# Patient Record
Sex: FEMALE | Race: WHITE | URBAN - METROPOLITAN AREA
[De-identification: names, ages, dates, MRNs, and addresses within clinical notes are randomized per-mention and may not be internally consistent; named-entity substitution may affect disease eponyms.]

---

## 2019-03-05 NOTE — TELEPHONE ENCOUNTER
Patient is on Dr. Judd Martinez list of patients that need follow up visit for either Well visit, HTN, Diabetes, etc,. .... Patient last seen 02/2016.  I need a confirmation from patient that he is seeing another PCP in order for me to fill out a  PCP CHANGE or R

## 2022-12-15 NOTE — TELEPHONE ENCOUNTER
Scheduled for:  Colonoscopy 73716  Provider Name:  Dr Ananya Da Silva  Date:  1/26/2023  Location:  [facility]  Sedation:  MAC  Time:  0537 (pt is aware to arrive at 1130)    Prep:  Colyte  Meds/Allergies Reconciled?:  Physician reviewed  Diagnosis with codes:  HSV B00.9; Constipation K59.00  Was patient informed to call insurance with codes (Y/N):  Y     Referral sent?:  NA  [facility] or [address] notified?:  I sent an electronic request to Endo Scheduling and received a confirmation today. Medication Orders:  Pt is aware to NOT take iron pills, herbal meds and diet supplements for 7 days before exam. Also to NOT take any form of alcohol, recreational drugs and any forms of ED meds 24 hours before exam.     Misc Orders:       Further instructions given by staff:  I discussed the prep intructions with the patient in office which SHE verbally understood. Copy of instructions was handed to patient as well. Patient was also advised he will receive a call from PAT nurse 72-24 hours prior procedure to schedule Covid test done.

## 2022-12-15 NOTE — PATIENT INSTRUCTIONS
-miralax in am  -fibercon or cirucel in evening  -gyne exam    1. Schedule colonoscopy with MAC w/ any female md [Diagnosis: brbpr, hsv1]    2.  bowel prep from pharmacy (split trilyte)    3. Continue all medications for procedure    4. Read all bowel prep instructions carefully    5. AVOID seeds, nuts, popcorn, raw fruits and vegetables (cooked is okay) for 2-3 days before procedure    6. You will need to be tested for COVID within 72 hours of your procedure. You will be contacted with instructions on how to do this.       >>>Please note: if you were prescribed Suprep for the bowel prep and it is too expensive or not covered by insurance, it is okay to substitute Trilyte (or any similar generic prep). This can be done by notifying the pharmacy or calling our office.

## 2023-01-20 PROBLEM — M35.9 CONNECTIVE TISSUE DISORDER (HCC): Status: ACTIVE | Noted: 2023-01-20

## 2023-01-23 NOTE — TELEPHONE ENCOUNTER
Patient wanted to know if pedialyte is a clear liquid. Wanted to know if she could have jello and if she could add spices to her broth. She also wanted to know if she could use a liquid sweetner in her coffee when she is on a clear liquid diet. She wanted to know if she could have canned peaches-I let her know when she is on a clear liquid diet she cannot have these as they are not a clear liquid. I also gave her our prep web address because it has a list of examples of what she can have on a clear liquid diet. All questions answered.

## 2023-01-26 ENCOUNTER — HOSPITAL ENCOUNTER (OUTPATIENT)
Facility: HOSPITAL | Age: 58
Setting detail: HOSPITAL OUTPATIENT SURGERY
Discharge: HOME | End: 2023-01-26
Attending: INTERNAL MEDICINE | Admitting: INTERNAL MEDICINE
Payer: COMMERCIAL

## 2023-01-26 ENCOUNTER — ANESTHESIA (OUTPATIENT)
Dept: ENDOSCOPY | Facility: HOSPITAL | Age: 58
End: 2023-01-26
Payer: COMMERCIAL

## 2023-01-26 ENCOUNTER — ANESTHESIA EVENT (OUTPATIENT)
Dept: ENDOSCOPY | Facility: HOSPITAL | Age: 58
End: 2023-01-26
Payer: COMMERCIAL

## 2023-01-26 VITALS
BODY MASS INDEX: 22.15 KG/M2 | HEART RATE: 60 BPM | HEIGHT: 63 IN | SYSTOLIC BLOOD PRESSURE: 102 MMHG | DIASTOLIC BLOOD PRESSURE: 81 MMHG | OXYGEN SATURATION: 95 % | TEMPERATURE: 98 F | RESPIRATION RATE: 17 BRPM | WEIGHT: 125 LBS

## 2023-01-26 DIAGNOSIS — K59.00 CONSTIPATION, UNSPECIFIED CONSTIPATION TYPE: ICD-10-CM

## 2023-01-26 DIAGNOSIS — B00.9 HERPES SIMPLEX VIRUS INFECTION: ICD-10-CM

## 2023-01-26 PROCEDURE — 0DBL8ZX EXCISION OF TRANSVERSE COLON, VIA NATURAL OR ARTIFICIAL OPENING ENDOSCOPIC, DIAGNOSTIC: ICD-10-PCS | Performed by: INTERNAL MEDICINE

## 2023-01-26 PROCEDURE — 45385 COLONOSCOPY W/LESION REMOVAL: CPT | Performed by: INTERNAL MEDICINE

## 2023-01-26 RX ORDER — MORPHINE SULFATE 4 MG/ML
2 INJECTION, SOLUTION INTRAMUSCULAR; INTRAVENOUS EVERY 10 MIN PRN
OUTPATIENT
Start: 2023-01-26

## 2023-01-26 RX ORDER — MORPHINE SULFATE 4 MG/ML
4 INJECTION, SOLUTION INTRAMUSCULAR; INTRAVENOUS EVERY 10 MIN PRN
OUTPATIENT
Start: 2023-01-26

## 2023-01-26 RX ORDER — NALOXONE HYDROCHLORIDE 0.4 MG/ML
80 INJECTION, SOLUTION INTRAMUSCULAR; INTRAVENOUS; SUBCUTANEOUS AS NEEDED
OUTPATIENT
Start: 2023-01-26 | End: 2023-01-26

## 2023-01-26 RX ORDER — HYDROMORPHONE HYDROCHLORIDE 1 MG/ML
0.6 INJECTION, SOLUTION INTRAMUSCULAR; INTRAVENOUS; SUBCUTANEOUS EVERY 5 MIN PRN
OUTPATIENT
Start: 2023-01-26

## 2023-01-26 RX ORDER — MIDAZOLAM HYDROCHLORIDE 1 MG/ML
INJECTION INTRAMUSCULAR; INTRAVENOUS AS NEEDED
Status: DISCONTINUED | OUTPATIENT
Start: 2023-01-26 | End: 2023-01-26 | Stop reason: SURG

## 2023-01-26 RX ORDER — HYDROMORPHONE HYDROCHLORIDE 1 MG/ML
0.2 INJECTION, SOLUTION INTRAMUSCULAR; INTRAVENOUS; SUBCUTANEOUS EVERY 5 MIN PRN
OUTPATIENT
Start: 2023-01-26

## 2023-01-26 RX ORDER — SODIUM CHLORIDE, SODIUM LACTATE, POTASSIUM CHLORIDE, CALCIUM CHLORIDE 600; 310; 30; 20 MG/100ML; MG/100ML; MG/100ML; MG/100ML
INJECTION, SOLUTION INTRAVENOUS CONTINUOUS
Status: DISCONTINUED | OUTPATIENT
Start: 2023-01-26 | End: 2023-01-26

## 2023-01-26 RX ORDER — SODIUM CHLORIDE, SODIUM LACTATE, POTASSIUM CHLORIDE, CALCIUM CHLORIDE 600; 310; 30; 20 MG/100ML; MG/100ML; MG/100ML; MG/100ML
INJECTION, SOLUTION INTRAVENOUS CONTINUOUS
OUTPATIENT
Start: 2023-01-26

## 2023-01-26 RX ORDER — HYDROMORPHONE HYDROCHLORIDE 1 MG/ML
0.4 INJECTION, SOLUTION INTRAMUSCULAR; INTRAVENOUS; SUBCUTANEOUS EVERY 5 MIN PRN
OUTPATIENT
Start: 2023-01-26

## 2023-01-26 RX ORDER — MORPHINE SULFATE 10 MG/ML
6 INJECTION, SOLUTION INTRAMUSCULAR; INTRAVENOUS EVERY 10 MIN PRN
OUTPATIENT
Start: 2023-01-26

## 2023-01-26 RX ORDER — ACYCLOVIR 800 MG/1
800 TABLET ORAL 2 TIMES DAILY
COMMUNITY

## 2023-01-26 RX ADMIN — SODIUM CHLORIDE, SODIUM LACTATE, POTASSIUM CHLORIDE, CALCIUM CHLORIDE: 600; 310; 30; 20 INJECTION, SOLUTION INTRAVENOUS at 12:34:00

## 2023-01-26 RX ADMIN — MIDAZOLAM HYDROCHLORIDE 2 MG: 1 INJECTION INTRAMUSCULAR; INTRAVENOUS at 12:49:00

## 2023-01-26 NOTE — DISCHARGE INSTRUCTIONS

## 2023-01-27 ENCOUNTER — TELEPHONE (OUTPATIENT)
Facility: CLINIC | Age: 58
End: 2023-01-27

## 2023-01-27 NOTE — TELEPHONE ENCOUNTER
Patient is calling to request a script for miralax since over the counter is to expensive and should like insurance to cover part of the cost. Does the patient have to schedule a follow up for this

## 2023-01-30 RX ORDER — POLYETHYLENE GLYCOL 3350 17 G/17G
17 POWDER, FOR SOLUTION ORAL DAILY
Qty: 1 EACH | Refills: 3 | Status: SHIPPED | OUTPATIENT
Start: 2023-01-30

## 2023-01-30 NOTE — TELEPHONE ENCOUNTER
Aki Weaver,    Please advise message below. Patient is asking script for Miralax d/t cost with otc. Thank you.

## 2023-01-30 NOTE — TELEPHONE ENCOUNTER
Marge Nguyen,    Patient is currently taking 2 scoops (total of 34 grams) daily in the morning and  fiber at hs and is working for her. Do you want to change the dose as above on the rx sent? Called patient, ALFREDO verified. Informed patient that miralax was sent to pharmacy.  She was asking if it is the same dose as she is currently taking 2 scoops Q am.

## 2023-01-31 ENCOUNTER — TELEPHONE (OUTPATIENT)
Facility: CLINIC | Age: 58
End: 2023-01-31

## 2023-01-31 RX ORDER — POLYETHYLENE GLYCOL 3350 17 G/17G
34 POWDER, FOR SOLUTION ORAL DAILY
Qty: 1020 G | Refills: 0 | Status: SHIPPED | OUTPATIENT
Start: 2023-01-31 | End: 2023-03-02

## 2023-01-31 NOTE — TELEPHONE ENCOUNTER
I sent rx for the bottle vs packets, so she should be okay. She can titrate the dose as needed (1-2 capfuls daily).     Thanks,  [place]

## 2023-01-31 NOTE — TELEPHONE ENCOUNTER
Called pt to discuss path  Diminutive polyp normal mucosa. Repeat colonoscopy advised in 10 years  She is also requesting RX for miralax two capful daily which is working currently for constipation, will send.

## 2023-04-10 RX ORDER — POLYETHYLENE GLYCOL 3350 17 G/17G
17 POWDER, FOR SOLUTION ORAL 2 TIMES DAILY
Qty: 1020 G | Refills: 0 | Status: SHIPPED | OUTPATIENT
Start: 2023-04-10 | End: 2023-05-10

## 2023-05-16 ENCOUNTER — TELEPHONE (OUTPATIENT)
Facility: CLINIC | Age: 58
End: 2023-05-16

## 2023-05-16 NOTE — TELEPHONE ENCOUNTER
Adam Smoker,    Please advise below. Thank you. Called patient to assess symptoms, name/ verified. Reported complaints:\"acidy feeling middle of back and around the front above sternum\", took zantac with relief initially    Onset: since last  East, had bad episode yesterday    Any difficulty swallowing:No    Burning or sensation of something stuck in throat: No    Hoarseness/sore throat/cough:No    If there is pain/discomfort: Y/N:Yes           If yes:location:collar bone area, sternal and back area    Pain rating now:1-2/10 at present    Food/drink Triggers: (fatty/greasy/alcohol/coffee):overeating in [facility]    She takes 14 oz of coffee every morning. Relieving factors:zantac    Taking aspirin: no    Taking PPI: No    She was on acyclovir for 1.5 years and was discontinued 2 days ago. Patient education was given as below:   Avoid Heartburn Triggers  - Laying down after meals (sit upright for at least 2-3 hours after eating meals). - Large meals (serving sizes proportional to palm of hand). - Eating late at night  - Spicy/greasy/acidic foods (fast food, orange/lime/lemon/red sauces). - Excessive caffeine, alcohol  -Tight fitting clothes     Try:  - Raising the head of the bed approximately 30 degrees  - Avoid food triggers-keep a food diary  - Eat food slowly and chew thoroughly    Pt is asking if she should see you.

## 2023-05-16 NOTE — TELEPHONE ENCOUNTER
Called patient, name/ verified. Informed patient need for follow up  appointment per MD order/recommendation    Offered available appointment. Patient confirmed and accepted appointment. Date, time, office location verified. Instructed patient to arrive 15 minutes before appt time. Patient verbalized understanding.        Your appointments     Date & Time Appointment Department [facility])    May 25, 2023 10:00 AM CDT [facility] [address], [facility])      No further action required         TE closed

## 2023-05-16 NOTE — TELEPHONE ENCOUNTER
Nursing:  Agree with triage recommendations. In addition, advise f/u in clinic for on-going symptoms.     Thanks,  [place]

## 2023-05-16 NOTE — TELEPHONE ENCOUNTER
Patient is calling because she has been having acid reflux at night but had one bad episode during the day. Patient is also requesting to continue on miralax.

## 2023-05-17 RX ORDER — POLYETHYLENE GLYCOL 3350 17 G/17G
17 POWDER, FOR SOLUTION ORAL 2 TIMES DAILY
Qty: 60 PACKET | Refills: 3 | Status: SHIPPED | OUTPATIENT
Start: 2023-05-17 | End: 2023-05-18

## 2023-05-17 NOTE — TELEPHONE ENCOUNTER
Called patient, name/ verified. Informed pt that her miralax was sent to her pharmacy.     Pt appreciated the jolene and information, verbalized understanding    No further action required         TE closed

## 2023-05-18 ENCOUNTER — TELEPHONE (OUTPATIENT)
Facility: CLINIC | Age: 58
End: 2023-05-18

## 2023-05-18 RX ORDER — POLYETHYLENE GLYCOL 3350 17 G/17G
1 POWDER ORAL 2 TIMES DAILY
Qty: 1020 G | Refills: 3 | OUTPATIENT
Start: 2023-05-18

## 2023-05-18 NOTE — TELEPHONE ENCOUNTER
RN called and spoke to pt. Pt states that miralax packets were ordered but they are much more expensive than the bottles of miralax she previously was prescribed. RN called and spoke to pharmacy, verbally gave approval to change to bottles and med list updated (so pt can get future refills as bottles). RN spoke to pt again and to inform her that change was made as requested and med is now ready for her. Pt appreciative and verbalized understanding.

## 2023-05-18 NOTE — TELEPHONE ENCOUNTER
Also see 4/10/2023 Refill Request - Patient requesting to speak with RN regarding Polyethylene Glycol - states rx is still incorrect. Please call. Thank you.

## 2023-05-25 ENCOUNTER — LAB ENCOUNTER (OUTPATIENT)
Dept: LAB | Age: 58
End: 2023-05-25
Attending: NURSE PRACTITIONER
Payer: COMMERCIAL

## 2023-05-25 ENCOUNTER — OFFICE VISIT (OUTPATIENT)
Dept: GASTROENTEROLOGY | Facility: CLINIC | Age: 58
End: 2023-05-25

## 2023-05-25 VITALS — WEIGHT: 126 LBS | BODY MASS INDEX: 22.32 KG/M2 | HEIGHT: 63 IN

## 2023-05-25 DIAGNOSIS — K59.00 CONSTIPATION, UNSPECIFIED CONSTIPATION TYPE: ICD-10-CM

## 2023-05-25 DIAGNOSIS — K21.9 GASTROESOPHAGEAL REFLUX DISEASE, UNSPECIFIED WHETHER ESOPHAGITIS PRESENT: Primary | ICD-10-CM

## 2023-05-25 DIAGNOSIS — Z12.11 COLON CANCER SCREENING: ICD-10-CM

## 2023-05-25 DIAGNOSIS — K21.9 GASTROESOPHAGEAL REFLUX DISEASE, UNSPECIFIED WHETHER ESOPHAGITIS PRESENT: ICD-10-CM

## 2023-05-25 PROCEDURE — 3008F BODY MASS INDEX DOCD: CPT | Performed by: NURSE PRACTITIONER

## 2023-05-25 PROCEDURE — 83013 H PYLORI (C-13) BREATH: CPT

## 2023-05-25 PROCEDURE — 99215 OFFICE O/P EST HI 40 MIN: CPT | Performed by: NURSE PRACTITIONER

## 2023-05-25 RX ORDER — VALACYCLOVIR HYDROCHLORIDE 1 G/1
1000 TABLET, FILM COATED ORAL 2 TIMES DAILY
COMMUNITY
Start: 2023-04-10

## 2023-05-25 RX ORDER — PANTOPRAZOLE SODIUM 40 MG/1
40 TABLET, DELAYED RELEASE ORAL DAILY
Qty: 30 TABLET | Refills: 5 | Status: SHIPPED | OUTPATIENT
Start: 2023-05-25 | End: 2023-06-24

## 2023-05-26 LAB — H PYLORI BREATH TEST: NEGATIVE

## 2023-06-19 ENCOUNTER — TELEPHONE (OUTPATIENT)
Facility: CLINIC | Age: 58
End: 2023-06-19

## 2023-06-19 NOTE — TELEPHONE ENCOUNTER
Patient states that she started a new medication and is supposed to inform MARIETTA Virgen, how it is working out for her and find out if she should continue it or get tests done?

## 2023-06-19 NOTE — TELEPHONE ENCOUNTER
Gia Cordoba,   Please see below. Pt was certain she was told to come back to clinic in 6 weeks (I see 6 week trial of pantoprazole). Please advise on if you want clinic in 6 weeks and if ok for pt to take before her 4 pm meal.   Thank you,  Minesh Suarez      Pt called to give symptom update. Clinic note says to take pantoprazole daily x 6 weeks and then decide further workup. Pt states she started med on 5/26 (approx  3 weeks ago). She states there is no improvement in symptoms, however, she has not been taking it before a meal.   Pt does intermittent fasting and has coffee and kefir in the morning. Will take all her morning meds a couple of hours after that. First meal of the day is at 4 pm. Discussed taking med 30- 60 before meal and monitoring; pt prefers to get instructions from provider on when to take med and when to RTC.

## 2023-06-26 NOTE — TELEPHONE ENCOUNTER
Pt contacted by uzma. She reports having kefir, coffee in am     Apn advised that she use pantoprazole 30 mg prior to kefir, coffee. F/u in approx 6-8 weeks to assess response and consider need for upper endoscopy. I advised she contact the office earlier than this if she is having minimal improvement in symptoms and/or develops red flags such as vomiting, melena, dysphagia, etc.  She verbalizes understanding and is in agreement with the plan.

## 2023-07-11 ENCOUNTER — TELEPHONE (OUTPATIENT)
Facility: CLINIC | Age: 58
End: 2023-07-11

## 2023-07-11 DIAGNOSIS — R11.0 NAUSEA: Primary | ICD-10-CM

## 2023-07-11 DIAGNOSIS — R14.0 BLOATING: ICD-10-CM

## 2023-07-11 NOTE — TELEPHONE ENCOUNTER
Praveena Lowry has been taking pantoprazole as directed since 6/26/23 and states her symptoms are worse; \"bad\" nausea, burping, and mild burning pain. She c/o worsening symptoms with exercise and at night. 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 Discomfort can keep her up at night. Has not tried OTC meds  in addition to daily pantoprazole. Pt would like to have EGD done. Do you want her to be seen in clinic? Schedule EGD? Please advise.   Thanks,  Shawna De La Cruz

## 2023-07-13 RX ORDER — PANTOPRAZOLE SODIUM 40 MG/1
40 TABLET, DELAYED RELEASE ORAL
Qty: 60 TABLET | Refills: 0 | Status: SHIPPED | OUTPATIENT
Start: 2023-07-13 | End: 2023-09-11

## 2023-07-13 NOTE — TELEPHONE ENCOUNTER
She reports nausea and fatigue with exercise. Has not had belching and gerd. HP neg 5/25/23  Not anemic on cbc 3/2023. She denies cp and/or pain in jaw or down her arm. She says EKG normal a little over 1 year ago    She has been taking pantoprazole 40 mg/daily. No vomiting, melena. Has bloating after eating. Has been on miralax with daily bm. Bloating improves with bm. No abd pain. Recommend:  Continue miralax  Reflux diet modification  Increase pantoprazole to 40 mg twice daily x 4 weeks and then reduce dose back to once daily  Ultrasound    Egd w/ mac w/ Dr. Sobia Verma or Dr. Montrell Null  Dx: gerd, nausea        ENDOSCOPIC RISK BENEFIT DISCUSSION: I described the procedure in great detail with the patient. I discussed the risks and benefits, including but not limited to: bleeding, perforation, infection, anesthesia complications, and even death. Patient will be NPO after midnight and will have a person physically present at time of pick-up to drive patient home. Patient verbalized understanding and agrees to proceed with procedure as planned.

## 2023-07-13 NOTE — TELEPHONE ENCOUNTER
Lyle Putnam, CMA1 hour ago (2:08 PM)     AB Anne Capellan-  Patent is scheduled for 7/18/2023 with Dr. Benito Benitez. Patient has questions about an ultrasound of her gallbladder that you spoke to her about. Please advise. Thank you!

## 2023-07-13 NOTE — TELEPHONE ENCOUNTER
Hanna returned pt call. She was confused where to schedule ultrasound. DARIANN offerred to give pt number to central scheduling. She was getting a manicure and could not write number down.   She stated she would contact main [facility] number and ask for ultrasound scheduling when ready to set-up exam.

## 2023-07-13 NOTE — TELEPHONE ENCOUNTER
PPD-  Patient is scheduled for an [phone] for Gerd K21.9 and Nausea R11.0 with Dr. Jennifer Spurling at [address] on Tuesday 7/18/2023. Thank you !

## 2023-07-13 NOTE — TELEPHONE ENCOUNTER
Gia Cordoba-  Patent is scheduled for 7/18/2023 with Dr. Blanca Frye. Patient has questions about an ultrasound of her gallbladder that you spoke to her about. Please advise. Thank you!

## 2023-07-13 NOTE — TELEPHONE ENCOUNTER
Scheduled for:  EGD [id number]  Provider Name:  Dr. Brad Aldrich  Date:  7/18/2023  [facility]      Sedation:  Mac  Time:  8:00 AM (pt is aware that [facility] will call the day before to confirm arrival time)  Prep:  NPO after midnight   Meds/Allergies Reconciled?:  Physician reviewed   Diagnosis with codes:  Braden Adams K21.9  Nausea R11.0  Was patient informed to call insurance with codes (Y/N):  Yes, I confirmed [facility] with the patient. Referral sent?:  Referral was sent at the time of electronic surgical scheduling. [facility] or [address] notified?:  I sent an electronic request to Endo Scheduling and received a confirmation today. Medication Orders: This patient verbally confirmed that she is not taking:   Iron, BP meds, and is not diabetic   Not latex allergy, Not PCN allergy and does not have a pacemaker  Pt is aware to NOT take iron pills, herbal meds and diet supplements for 7 days before exam. Also to NOT take any form of alcohol, recreational drugs and any forms of ED meds 24 hours before exam.        Misc Orders:  I discussed the prep instructions with the patient which she verbally understood and is aware that I will mail the instructions today.        Further instructions given by staff:

## 2023-07-17 ENCOUNTER — TELEPHONE (OUTPATIENT)
Facility: CLINIC | Age: 58
End: 2023-07-17

## 2023-07-17 NOTE — TELEPHONE ENCOUNTER
RN called and spoke to pt. Pt recently spoke to [facility] and had no further questions. GI RN to follow up on pantoprazole orders.

## 2023-07-17 NOTE — TELEPHONE ENCOUNTER
Pt calling for instructions for 7/18/23 EGD. Pt states that she is headed out to eat breakfast so she needs a call back ASAP, if she can't eat this morning.    Please call

## 2023-07-17 NOTE — TELEPHONE ENCOUNTER
Current Outpatient Medications   Medication Sig Dispense Refill    pantoprazole 40 MG Oral Tab EC Take 1 tablet (40 mg total) by mouth 2 (two) times daily before meals.  60 tablet 0       Key: [id number]

## 2023-07-17 NOTE — TELEPHONE ENCOUNTER
PPD: Please obtain PA for pantoprazole. Thank you! Pt to increase her pantoprazole to 40 mg twice a day. Dispense 60 tabs for 30 days. Pt was taking it once a day but still having symptoms.    Dx codes: K21.9, R11.0

## 2023-07-18 ENCOUNTER — SURGERY CENTER DOCUMENTATION (OUTPATIENT)
Dept: SURGERY | Age: 58
End: 2023-07-18

## 2023-07-18 ENCOUNTER — LAB REQUISITION (OUTPATIENT)
Dept: SURGERY | Age: 58
End: 2023-07-18
Payer: COMMERCIAL

## 2023-07-18 DIAGNOSIS — R11.0 NAUSEA: ICD-10-CM

## 2023-07-18 DIAGNOSIS — K21.9 GASTROESOPHAGEAL REFLUX DISEASE: ICD-10-CM

## 2023-07-18 PROCEDURE — 88312 SPECIAL STAINS GROUP 1: CPT | Performed by: INTERNAL MEDICINE

## 2023-07-18 PROCEDURE — 88305 TISSUE EXAM BY PATHOLOGIST: CPT | Performed by: INTERNAL MEDICINE

## 2023-07-18 NOTE — PROCEDURES
ESOPHAGOGASTRODUODENOSCOPY (EGD) REPORT    [address]     1965 Age 66year old   PCP Xavi Llamas MD Endoscopist Raymond Rod MD       Date of procedure: 23    Procedure: EGD w/biopsies and cold biopsy polypectomy    Pre-operative diagnosis: reflux and nausea    Post-operative diagnosis: large hiatal hernia, gastric polyps and irregular z line    Medications: MAC sedation    Complications: none    Procedure:  Informed consent was obtained from the patient after the risks of the procedure were discussed, including but not limited to bleeding, perforation, aspiration, infection, or possibility of a missed lesion. After discussions of the risks/benefits and alternatives to this procedure, as well as the planned sedation, the patient was placed in the left lateral decubitus position and begun on continuous blood pressure pulse oximetry and EKG monitoring and this was maintained throughout the procedure. Once an adequate level of sedation was obtained a bite block was placed. Then the lubricated tip of the [id number] diagnostic video upper endoscope was inserted and advanced using direct visualization into the posterior pharynx and ultimately into the esophagus. Complications: None    Findings:      1. Esophagus: The squamocolumnar junction was noted at 35 cm and appeared irregular. The GE junction was noted at 35 cm from the incisors. Hiatus was at 40 cm. The esophageal mucosa appeared otherwise normal. Distal esophageal biopsies taken of the 1 cm tongue to rule out barretts    2. Stomach: The stomach distended normally. Normal rugal folds were seen. The pylorus was patent. The gastric mucosa appeared normal but there were small polyps, two removed with cold biopsy polypectomy. Retroflexion revealed a normal fundus and a normal-patulous cardia. 3. Duodenum: The duodenal mucosa appeared normal in the 1st and 2nd portion of the duodenum.  Biopsied to rule out celiac    Impression:  1. Likely from reflux and hiatal hernia    Recommend:  1. Here are some reflux precautions:   - Avoid trigger foods  - Anti-reflux measures: raising the head of the bed at night, avoiding tight clothing or belts, avoiding eating late at night and not lying down shortly after mealtime and achieving weight loss   - Avoid NSAID's, caffeine, peppermints, alcohol, tobacco and foods that incite symptoms   2. Continue pantoprazole daily before breakfast    >>>If tissue was sampled/removed and you have not received your pathology results either by phone or letter within 2 weeks, please call our office at [phone].     Specimens:  Duodenal biopsies, gastric polyps and distal esophageal biopsies

## 2023-07-19 ENCOUNTER — TELEPHONE (OUTPATIENT)
Facility: CLINIC | Age: 58
End: 2023-07-19

## 2023-07-19 NOTE — TELEPHONE ENCOUNTER
Dr. John Oropeza,   I called pt to inform her the trial of pantoprazole BID x 4 weeks was approved and pt said she was told to stop pantoprazole entirely and take tums and/or pepcid as needed. Procedure report stated to use pantoprazole daily. Please clarify. Pt also wants to have US done at OSH/facility closer to her home. I see the US of complete abdomen ordered; pt wanted to verify that this will focus on her gallbladder. I assume complete abdomen looks at everything but please verify. I will fax order and work on insurance approval for outside facility. Lastly, pt wants to only see you in the future. Please advise if you have any recommendations regarding f/u at this time.   Thanks,  Darvin Vazquez

## 2023-07-19 NOTE — TELEPHONE ENCOUNTER
Per cover my  meds Pantoprazole approved until 7/19/2024. Arlene, [phone], spoke with Deloris Ceja, she states went through. GI staff, please notify patient of approval. No my chart account available. Thank you!

## 2023-07-19 NOTE — TELEPHONE ENCOUNTER
Medication PA Requested:   pantoprazole 40 MG Oral Tab EC                                                        CoverMyMeds Used:  OCF:[id number] per cover my meds [employer] does not review for this plan  Quantity:  60 tablet   Day Supply:30  Sig: Take 1 tablet (40 mg total) by mouth 2 (two) times daily before meals. Pt was taking it once a day but still having symptoms. Dx codes: K23.10, GERD [address] [place], [phone], spoke with Eligio Torres, states benefit  [id number]  PCN  IRX  GRP CONSTELLATION  #[id number]  [facility]: [phone]    [address] [phone], spoke with Georgina. Informed of above-she states need to add 02 at end of ID#. Should be [id number]. She created [employer] REF# [id number]    Pa submitted with LOV 5/25/2023 and TE 7/13/2023  Awaiting determination.

## 2023-07-19 NOTE — TELEPHONE ENCOUNTER
Carlos Team: Can you please obtain insurance approval for ultrasound? Please also route back to GI RN with determination for follow up. Pt would like to have ultrasound done at outside facility. The GI office will fax the ultrasound order to the preferred facility. The facility the pt wants to have US done at is [facility] at [address] [place] in [place], [place]. The insurance team at that site is  # [phone] for any further questions. I believe NPI for site is: [id number]  Tax ID for site: [id number]    Thank you!   Kristyn Victoria

## 2023-07-19 NOTE — TELEPHONE ENCOUNTER
Yes after the procedure she stated PPI did not work and caused bloating and did not want to be on it due to risk. For now told her ok to hold and await biopsies. If barretts will have to be on it. For now do famotidine at night and tums up to twice a day as needed for breakthrough    US ordered by Chloé Walden NP but yes it should be focus on gallbladder and liver and will cover that as well    Thank you.

## 2023-07-21 NOTE — TELEPHONE ENCOUNTER
Dr. Luna Moeller,   Pt has many questions about her hernia. She does not have MyChart so she has not seen her pathology results yet but she will want to discuss that in detail as well. This pt wants to speak with you directly. She also wants to schedule a clinic appt with you to go over everything but does not want to wait until first available. She wants to have [address] done at outside facility [facility]); orders have been sent and pt will call to schedule. Please advise on how you want to proceed. Thanks,  Rocio Rogers        RN spoke to pt, informed her that she can call [facility] and schedule her US. Instructed pt to notify GI office if there are any issues scheduling as orders have been sent and insurance team states authorization is not needed for US. Pt had many questions about her hernia; informed her that MD is out of office today but will be back next week and will follow up. Pt agreeable and verbalized understanding.

## 2023-07-21 NOTE — TELEPHONE ENCOUNTER
Dr. Vahid Blackwood,   Pt has many questions about her hernia. She does not have MyChart so she has not seen her pathology results yet but she will want to discuss that in detail as well. This pt wants to speak with you directly. She also wants to schedule a clinic appt with you to go over everything but does not want to wait until first available. She wants to have [address] done at outside facility [facility]); orders have been sent and pt will call to schedule. Please advise on how you want to proceed. Thanks,  Niels Robison RN spoke to pt, informed her that she can call [facility] and schedule her US. Instructed pt to notify GI office if there are any issues scheduling as orders have been sent and insurance team states authorization is not needed for US. Pt had many questions about her hernia; informed her that MD is out of office today but will be back next week and will follow up. Pt agreeable and verbalized understanding.

## 2023-07-21 NOTE — TELEPHONE ENCOUNTER
Susan Gupta (7:57 AM)     [address] [facility]. is not required for an ultrasound under this health plan.

## 2023-07-28 NOTE — TELEPHONE ENCOUNTER
Dr. Duarte Dowd,   Office received fax of written report from recent [address] (ordered by [facility] but pt only wants to talk wit you). A copy sent for scanning and a copy left on your desk. See below note for more information. Let me know if I can do anything else.   Thanks,  Mike Trujillo

## 2023-07-30 NOTE — TELEPHONE ENCOUNTER
Given there are multiple results and may be more to discuss and recommend ok to double book to review everything since I am booked out    Thank you.

## 2023-07-31 NOTE — TELEPHONE ENCOUNTER
RN called and spoke to pt at length regarding her questions about pathology, hernia, and US results. Informed pt that MD has not given her official review of results yet but that she has agreed to scheduling an appt in near future to discuss. Pt scheduled for clinic on 8/9/23. Date, time, and location verified with pt. Pt verbalized understanding.     Your Appointments      Wednesday August 09, 2023  1:00 PM  Follow Up Visit with Alonso Alejandre MD  [address], [address] [facility], [address] ([address]) [address] [facility]  [phone]

## 2023-08-09 ENCOUNTER — HOSPITAL ENCOUNTER (OUTPATIENT)
Dept: GENERAL RADIOLOGY | Facility: HOSPITAL | Age: 58
Discharge: HOME | End: 2023-08-09
Attending: INTERNAL MEDICINE
Payer: COMMERCIAL

## 2023-08-09 ENCOUNTER — OFFICE VISIT (OUTPATIENT)
Dept: GASTROENTEROLOGY | Facility: CLINIC | Age: 58
End: 2023-08-09

## 2023-08-09 VITALS — BODY MASS INDEX: 22 KG/M2 | HEIGHT: 63 IN

## 2023-08-09 DIAGNOSIS — R10.10 PAIN OF UPPER ABDOMEN: ICD-10-CM

## 2023-08-09 DIAGNOSIS — K21.9 GASTROESOPHAGEAL REFLUX DISEASE WITHOUT ESOPHAGITIS: ICD-10-CM

## 2023-08-09 DIAGNOSIS — K44.9 HIATAL HERNIA: Primary | ICD-10-CM

## 2023-08-09 PROCEDURE — 3008F BODY MASS INDEX DOCD: CPT | Performed by: INTERNAL MEDICINE

## 2023-08-09 PROCEDURE — 99215 OFFICE O/P EST HI 40 MIN: CPT | Performed by: INTERNAL MEDICINE

## 2023-08-09 PROCEDURE — 74018 RADEX ABDOMEN 1 VIEW: CPT | Performed by: INTERNAL MEDICINE

## 2023-08-09 RX ORDER — LINACLOTIDE 145 UG/1
1 CAPSULE, GELATIN COATED ORAL DAILY
Qty: 30 CAPSULE | Refills: 11 | Status: SHIPPED | OUTPATIENT
Start: 2023-08-09 | End: 2023-09-08

## 2023-08-09 NOTE — PATIENT INSTRUCTIONS
1. History of constipation  2.  Hiatal hernia and reflux        Recommend:  Xray abd to look for stool burden  Here are some reflux precautions:   - Avoid trigger foods  - Anti-reflux measures: raising the head of the bed at night, avoiding tight clothing or belts, avoiding eating late at night and not lying down shortly after mealtime and achieving weight loss   - Avoid NSAID's, caffeine, peppermints, alcohol, tobacco and foods that incite symptoms   Continue acid reflux medication as needed and precautions  When bloating goes away feels better  Will change to linzess daily for constipation and pain  FODMAP diet

## 2023-08-09 NOTE — PROGRESS NOTES
[address] Gastroenterology  Clinic Follow-up Visit    Patient presents with: Follow - Up        Subjective/HPI:   Johanny Tobar is a 66year old female, patient of Dr. Myron Day with history of alopecia, connective tissue disease, who presents for hiatal hernia. Past Visit(s):  12/15/2022-- seen by Jarrod Aceves NP  Joanna Mendez is a 56year old year-old female with history of alopecia, connective tissue d/o:     she is here today for evaluation rectal complaints  She reports constipation x last year and since going off of birth control     Was using a supplement from [facility] for arthritis pain. Was taken off the market bc had diclofenac sodium and methocarbamol in some of the bottles. Has been taking it for 2 years. Has noticed weight gain since stopping medications. Has had increased joint pain. Is using calorie restriction to lose weight. 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 Was treated by infectious disease. Endorses anal intercourse. She says had lab testing (-) HIV, Hep testing, syphillis  Did not have anal pap/gyne exam  Has had vaginal spotting and planning to see gyne     Endorses oral sex with partner  Denies ulcer is mouth, pharyngitis. She denies acid reflux and/or heartburn. she denies dysphagia, odynophagia and/or globus. she denies abdominal pain. she denies nausea and/or vomiting. she denies recent change in appetite and/or unintentional weight loss. Baseline bm daily. Now moving bowels every few days. Has tried stool softeners, high fiber diet minimal improvement. kamara magnesium, dulcolax works when she takes it. Has incomplete evacuation. she says has low volume bleeding after bm on toilet paper. No rectal pain. No melena.       No vision issues  ======================================================  Colonoscopy with Dr. Cierra Askew 1/2023    Findings:   -- TERRY: normal rectal tone, no masses palpated. Visual exam of anus shows faint skin splits at the anus, significance not known     -- Terminal ileum: the visualized mucosa appeared normal.     -- 1 polyp(s) noted as follows:      A. 4 mm polyp in the hepatic flexure; sessile morphology; cold snare polypectomy and retrieved. -- A retroflexed view of the rectum revealed no abnormalities. -- The colonic mucosa throughout the colon showed normal vascular pattern, without evidence of angioectasias or inflammation. Impression:   One small polyp resected and retrieved  Otherwise normal colonoscopy     Recommend:  Pending pathology, repeat colonoscopy in 5-7 years. Treatment of HSV1 per primary    F/u with Amee Souza NP 5/25/2023-------------------------------------  Vinny Jaquez is a 66year old year-old female with history of alopecia, connective tissue d/o:     she is here today for f/u  S/p cln w/ Dr. Jose Vanegas 1/2023  Diminutive polyp normal mucosa. Repeat colonoscopy advised in 10 years  Started on miralax for constipation     Contacted office 5/2023 w/ c/o acid reflux. Onset around easter and after eating more. She describes burning pain in abd, chest.  Says was severe and thought about calling 911. Started zantac OTC     She says has realized she has been taking having reflux x last year. Drinks coffee in am and seems worse afterwards. Started drinking kefir with some improvement. She hasn't been taking medication. she denies dysphagia, odynophagia and/or globus. she denies abdominal pain. she denies nausea and/or vomiting. she denies recent change in appetite and/or unintentional weight loss. She denies chest pain and/or exertional sx.      she moves her bowels daily with miralax. she denies straining and/or incomplete evacuation. she denies brbpr and/or melena. EGD 7/2023==============================================    Findings:    1. Esophagus:  The squamocolumnar junction was noted at 35 cm and appeared irregular. The GE junction was noted at 35 cm from the incisors. Hiatus was at 40 cm. The esophageal mucosa appeared otherwise normal. Distal esophageal biopsies taken of the 1 cm tongue to rule out barretts     2. Stomach: The stomach distended normally. Normal rugal folds were seen. The pylorus was patent. The gastric mucosa appeared normal but there were small polyps, two removed with cold biopsy polypectomy. Retroflexion revealed a normal fundus and a normal-patulous cardia. 3. Duodenum: The duodenal mucosa appeared normal in the 1st and 2nd portion of the duodenum. Biopsied to rule out celiac     Impression:  1. Likely from reflux and hiatal hernia     Recommend:  1. Here are some reflux precautions:   - Avoid trigger foods  - Anti-reflux measures: raising the head of the bed at night, avoiding tight clothing or belts, avoiding eating late at night and not lying down shortly after mealtime and achieving weight loss   - Avoid NSAID's, caffeine, peppermints, alcohol, tobacco and foods that incite symptoms   2. Continue pantoprazole daily before breakfast     >>>If tissue was sampled/removed and you have not received your pathology results either by phone or letter within 2 weeks, please call our office at [phone]. Specimens:  Duodenal biopsies, gastric polyps and distal esophageal biopsies  Final Diagnosis:      A. Duodenal biopsy:  Fragments of duodenal mucosa with preserved villous architecture. No evidence of dysplasia or carcinoma identified. B. Gastric polyp:   Polypoid fragments of gastric oxyntic type mucosa with focal features of fundic gland polyp and foveolar hyperplasia. No evidence of dysplasia or carcinoma identified. C. Distal esophagus; biopsy:  Fragments of squamocolumnar mucosa with mild chronic inflammation, features of reflux and reactive epithelial change. No definitive evidence of intestinal metaplasia, dysplasia or carcinoma identified. ===========================================================================    In office today, pt presents for f/u. Questions about the hiatal hernia  Has bloating and has pain after eating anything  Mostly epigastric fullness  Miralax 2 capfuls daily and goes  Did help with the bloating and having BM now  Has tried miralax and other over the counters  Never tried linzess  Overall, the patient feels well and denies any GI symptoms of abdominal pain, nausea, vomiting, change in bowel habits, dyspepsia, dysphagia, hematemesis, hematochezia, or melena. Patient has a bowel movement each day with miralax. Additionally there is no change of appetite, unexpected weight loss, and no reported history of chest pain or shortness of breath. Pertinent Surgical Hx:  - See additional surgical hx below  - No known issues with sedation.  - No known history of sleep apnea. Social Hx:  - No smoking/etoh  - Denies illicit drug use   - NSAIDs/ASA use: PRN      History, Medications, Allergies, ROS:      Past Medical History:   Diagnosis Date    Alopecia     Connective tissue disorder ([facility] [address]     Sleep apnea       Past Surgical History:   Procedure Laterality Date    COLONOSCOPY N/A 2023    Procedure: COLONOSCOPY;  Surgeon:  Danya Frank MD;  Location: [address] [place] [address] ENDOSCOPY    D & C      [facility]    LEG/ANKLE SURGERY PROC UNLISTED            OTHER SURGICAL HISTORY  2009    breast augmentation and tummy tuck      Family History   Problem Relation Age of Onset    Diabetes Mother     Diabetes Father     High Blood Pressure Mother     High Blood Pressure Father     High Cholesterol Mother     High Cholesterol Father       Social History:   Social History     Socioeconomic History    Marital status: Legally    Tobacco Use    Smoking status: Never    Smokeless tobacco: Never   Substance and Sexual Activity    Alcohol use: No     Alcohol/week: 0.0 standard drinks of alcohol    Drug use: No    Sexual activity: Not Currently     Partners: Male   Other Topics Concern    Caffeine Concern Yes     Comment: 1-1.5 cups of coffee daily, 1 cup of tea daily    Exercise Yes     Comment: dancing 2-3 times weekly        Medications (Active prior to today's visit):  Current Outpatient Medications   Medication Sig Dispense Refill    linaCLOtide (LINZESS) 145 MCG Oral Cap Take 1 capsule by mouth daily. 30 capsule 11    acyclovir 800 MG Oral Tab Take 1 tablet (800 mg total) by mouth 2 (two) times daily. NON FORMULARY Thyroid NP      spironolactone (ALDACTONE) 100 MG Oral Tab 1 tablet (100 mg total) daily.  0    HYDROXYCHLOROQUINE SULFATE 200 MG OR TABS 1 TABLET DAILY      pantoprazole 40 MG Oral Tab EC Take 1 tablet (40 mg total) by mouth 2 (two) times daily before meals. (Patient not taking: Reported on 8/9/2023) 60 tablet 0    valACYclovir 1 G Oral Tab Take 1 tablet (1,000 mg total) by mouth 2 (two) times daily. (Patient not taking: Reported on 5/25/2023)      Polyethylene Glycol 3350 (PEG 3350) 17 GM/SCOOP Oral Powder Take 1 Capful by mouth 2 (two) times daily. (Patient not taking: Reported on 5/25/2023) 1020 g 3       Allergies:    Hydrocodone             NAUSEA AND VOMITING    ROS:   CONSTITUTIONAL:  negative for fevers, chills  EYES:  negative for change in vision  RESPIRATORY:  negative for  shortness of breath  CARDIOVASCULAR:  negative for  chest pain  GASTROINTESTINAL:  see HPI  GENITOURINARY:  negative for dysuria and hematuria   SKIN:  negative for  rash  ALLERGIC/IMMUNOLOGIC:  negative for hay fever allergies  ENDOCRINE:  negative for cold intolerance and heat intolerance  MUSCULOSKELETAL:  negative for  joint stiffness and joint swelling  BEHAVIOR/PSYCH:  negative for depressed mood    PHYSICAL EXAM:   Height 5' 3\" (1.6 m).     Gen: patient appears comfortable and in no acute distress  HEENT: conjunctiva pink, the sclera appears anicteric, oropharynx clear, mucus membranes appear moist  CV: regular rate and rhythm, the extremities are warm and well perfused   Lung: effort normal and breath sounds normal, no respiratory distress, wheezes or rales  GI: soft, non-tender exam in all quadrants without rigidity or guarding, non-distended, no abnormal bowel sounds noted, no masses are palpated  : no CVA tenderness  Skin: dry, warm, no jaundice  Ext: no cyanosis, clubbing or edema is evident. Neuro: Alert and oriented x4, and patient is having movements of all 4 extremities   Psych: Pt has a normal mood and affect, behavior is normal    Nursing note and vitals reviewed      Labs/Imaging:     Patient's labs and imaging were reviewed and discussed with patient today. See HPI and A&P for further details. .  ASSESSMENT/PLAN:       1. History of constipation  2. Hiatal hernia and reflux      Recommend:  Xray abd to look for stool burden  Here are some reflux precautions:   - Avoid trigger foods  - Anti-reflux measures: raising the head of the bed at night, avoiding tight clothing or belts, avoiding eating late at night and not lying down shortly after mealtime and achieving weight loss   - Avoid NSAID's, caffeine, peppermints, alcohol, tobacco and foods that incite symptoms   Continue acid reflux medication as needed and precautions  When bloating goes away feels better  Will change to linzess daily for constipation and pain  FODMAP diet          Orders This Visit:  No orders of the defined types were placed in this encounter. Meds This Visit:  Requested Prescriptions     Signed Prescriptions Disp Refills    linaCLOtide (LINZESS) 145 MCG Oral Cap 30 capsule 11     Sig: Take 1 capsule by mouth daily.        Imaging & Referrals:  None

## 2023-08-16 ENCOUNTER — TELEPHONE (OUTPATIENT)
Facility: CLINIC | Age: 58
End: 2023-08-16

## 2023-08-16 DIAGNOSIS — K76.89 LIVER CYST: Primary | ICD-10-CM

## 2023-08-16 NOTE — TELEPHONE ENCOUNTER
Dr. Anthony Dyson,   I spoke to pt, she picked up linzess but did not start it yet-will start it tomorrow. She stated that she forgot to ask about the \"inflammation in her stomach\" and how she should treat it (on US per pt). I do not see anything stating gastric inflammation on scanned US in media, maybe she meant to say on EGD? Discussed avoid irritating foods. Pt refused to take pantoprazole per recent conversation. Please advise.   Thanks,  Debra Vega

## 2023-08-16 NOTE — TELEPHONE ENCOUNTER
No inflammation in stomach on EGD or US  - Avoid trigger foods  - Anti-reflux measures: raising the head of the bed at night, avoiding tight clothing or belts, avoiding eating late at night and not lying down shortly after mealtime and achieving weight loss   - Avoid NSAID's, caffeine, peppermints, alcohol, tobacco and foods that incite symptoms     Also after reviewing US would repeat in 6 months to follow up liver cysts though likely benign

## 2023-08-16 NOTE — TELEPHONE ENCOUNTER
----- Message from Tanner Yang MD sent at 8/10/2023  1:34 PM CDT -----  Please notify patient mild constipation still seen  Would recommend linzess trial and reassess

## 2023-08-17 ENCOUNTER — TELEPHONE (OUTPATIENT)
Dept: GASTROENTEROLOGY | Facility: CLINIC | Age: 58
End: 2023-08-17

## 2023-08-18 NOTE — TELEPHONE ENCOUNTER
RN called and spoke to pt, informed her of Dr. John Zapata recommendations per below including repeat US in 6 months. Pt states she just started linzess yesterday and reports burning in esophagus and spine. Pt in NAD on call. Pt states she also started an antibiotic TID. Informed pt that symptoms unlikely from linzess. Pt states she will take med for another week and see how things go. Discussed that pt could hold off on taking linzess until her new antibiotic therapy is completed to see if symptoms persist with just abx use; pt declined and states she will take both as directed and see how she does.

## 2023-08-18 NOTE — TELEPHONE ENCOUNTER
Recall US in 6 months per Dr. Rosy Trotter. Message sent to pt outreach for repeat US (due February 2024).

## 2023-10-21 ENCOUNTER — TELEPHONE (OUTPATIENT)
Facility: CLINIC | Age: 58
End: 2023-10-21

## 2023-10-21 NOTE — TELEPHONE ENCOUNTER
GI on call;  Contacted for ongoing constipation - on Linzess 145 x 3 months, feels like is working but had questions about increasing dose to 290. Has hard difficult to pass stools - has used miralax in the past, will have the pt try daily miralax through the weekend and then have Dr. Ramachandran Even determine if increasing to 290 mcg dosing /further management of constipation.

## 2023-10-23 NOTE — TELEPHONE ENCOUNTER
Ok to increase to 290 mcg linzess  Follow up in office to review with me or Baudilio Perez NP next available is ok or MD approval slot

## 2023-10-24 NOTE — TELEPHONE ENCOUNTER
RN called and spoke to pt. Pt states she has had \"severe constipation last 5 weeks\" but states the irritability of stomach has resolved. Pt at [facility] so unable to discuss further. Informed pt that MD did order higher dose of med but that pharmacy stated med would not be in until Wednesday (tomorrow). Pt scheduled for clinic on 11/8/23 as she only wants to see Dr. Lul Roche. Pt verbalized understanding.      Your Appointments      Wednesday November 08, 2023 11:00 AM  Follow Up Visit with MD Ernesto Ledesma, [address], [place] ([address]) [address] [facility]  [phone]

## 2023-11-07 ENCOUNTER — TELEPHONE (OUTPATIENT)
Facility: CLINIC | Age: 58
End: 2023-11-07

## 2023-11-07 NOTE — TELEPHONE ENCOUNTER
1st,overdue reminder letter sent to patient via mail and my chart.     Imaging order:    US ABDOMEN COMPLETE (CPT=76700) (Order #[id number]) on 7/13/23

## 2023-11-08 ENCOUNTER — OFFICE VISIT (OUTPATIENT)
Dept: GASTROENTEROLOGY | Facility: CLINIC | Age: 58
End: 2023-11-08

## 2023-11-08 VITALS — DIASTOLIC BLOOD PRESSURE: 73 MMHG | SYSTOLIC BLOOD PRESSURE: 133 MMHG | HEART RATE: 72 BPM

## 2023-11-08 DIAGNOSIS — K59.09 OTHER CONSTIPATION: Primary | ICD-10-CM

## 2023-11-08 DIAGNOSIS — K58.1 IRRITABLE BOWEL SYNDROME WITH CONSTIPATION: ICD-10-CM

## 2023-11-08 PROCEDURE — 99214 OFFICE O/P EST MOD 30 MIN: CPT | Performed by: INTERNAL MEDICINE

## 2023-11-08 PROCEDURE — 3075F SYST BP GE 130 - 139MM HG: CPT | Performed by: INTERNAL MEDICINE

## 2023-11-08 PROCEDURE — 3078F DIAST BP <80 MM HG: CPT | Performed by: INTERNAL MEDICINE

## 2023-11-08 RX ORDER — DULOXETIN HYDROCHLORIDE 30 MG/1
30 CAPSULE, DELAYED RELEASE ORAL DAILY
COMMUNITY
Start: 2023-10-05

## 2023-11-08 NOTE — PATIENT INSTRUCTIONS
1. History of constipation  2.  Hiatal hernia and reflux    Recommend:  Now on the linzess 290 mcg daily  Add fiber to diet  Here are some reflux precautions:   - Avoid trigger foods  - Anti-reflux measures: raising the head of the bed at night, avoiding tight clothing or belts, avoiding eating late at night and not lying down shortly after mealtime and achieving weight loss   - Avoid NSAID's, caffeine, peppermints, alcohol, tobacco and foods that incite symptoms   - Tums as needed

## 2023-11-08 NOTE — PROGRESS NOTES
[address] Gastroenterology  Clinic Follow-up Visit    Chief Complaint   Patient presents with    Follow - Up     Changed of bowel habits         Subjective/HPI:   Vish Saucedo is a 66year old female, patient of Dr. Khushbu Osullivan with history of alopecia, connective tissue disease, who presents for hiatal hernia. Past Visit(s):  12/15/2022-- seen by Mary Luis NP  Yesi Tamayo is a 56year old year-old female with history of alopecia, connective tissue d/o:     she is here today for evaluation rectal complaints  She reports constipation x last year and since going off of birth control     Was using a supplement from [facility] for arthritis pain. Was taken off the market bc had diclofenac sodium and methocarbamol in some of the bottles. Has been taking it for 2 years. Has noticed weight gain since stopping medications. Has had increased joint pain. Is using calorie restriction to lose weight. 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 Was treated by infectious disease. Endorses anal intercourse. She says had lab testing (-) HIV, Hep testing, syphillis  Did not have anal pap/gyne exam  Has had vaginal spotting and planning to see gyne     Endorses oral sex with partner  Denies ulcer is mouth, pharyngitis. She denies acid reflux and/or heartburn. she denies dysphagia, odynophagia and/or globus. she denies abdominal pain. she denies nausea and/or vomiting. she denies recent change in appetite and/or unintentional weight loss. Baseline bm daily. Now moving bowels every few days. Has tried stool softeners, high fiber diet minimal improvement. kamara magnesium, dulcolax works when she takes it. Has incomplete evacuation. she says has low volume bleeding after bm on toilet paper. No rectal pain. No melena.       No vision issues  ======================================================  Colonoscopy with Dr. John Damon 1/2023    Findings:   -- TERRY: normal rectal tone, no masses palpated. Visual exam of anus shows faint skin splits at the anus, significance not known     -- Terminal ileum: the visualized mucosa appeared normal.     -- 1 polyp(s) noted as follows:      A. 4 mm polyp in the hepatic flexure; sessile morphology; cold snare polypectomy and retrieved. -- A retroflexed view of the rectum revealed no abnormalities. -- The colonic mucosa throughout the colon showed normal vascular pattern, without evidence of angioectasias or inflammation. Impression:   One small polyp resected and retrieved  Otherwise normal colonoscopy     Recommend:  Pending pathology, repeat colonoscopy in 5-7 years. Treatment of HSV1 per primary    F/u with Paramjit JOSIAH Mckeon 5/25/2023-------------------------------------  Jeffrey Jang is a 66year old year-old female with history of alopecia, connective tissue d/o:     she is here today for f/u  S/p cln w/ Dr. Gust Galeazzi 1/2023  Diminutive polyp normal mucosa. Repeat colonoscopy advised in 10 years  Started on miralax for constipation     Contacted office 5/2023 w/ c/o acid reflux. Onset around easter and after eating more. She describes burning pain in abd, chest.  Says was severe and thought about calling 911. Started zantac OTC     She says has realized she has been taking having reflux x last year. Drinks coffee in am and seems worse afterwards. Started drinking kefir with some improvement. She hasn't been taking medication. she denies dysphagia, odynophagia and/or globus. she denies abdominal pain. she denies nausea and/or vomiting. she denies recent change in appetite and/or unintentional weight loss. She denies chest pain and/or exertional sx.      she moves her bowels daily with miralax. she denies straining and/or incomplete evacuation. she denies brbpr and/or melena. EGD 7/2023==============================================    Findings:    1.  Esophagus: The squamocolumnar junction was noted at 35 cm and appeared irregular. The GE junction was noted at 35 cm from the incisors. Hiatus was at 40 cm. The esophageal mucosa appeared otherwise normal. Distal esophageal biopsies taken of the 1 cm tongue to rule out barretts     2. Stomach: The stomach distended normally. Normal rugal folds were seen. The pylorus was patent. The gastric mucosa appeared normal but there were small polyps, two removed with cold biopsy polypectomy. Retroflexion revealed a normal fundus and a normal-patulous cardia. 3. Duodenum: The duodenal mucosa appeared normal in the 1st and 2nd portion of the duodenum. Biopsied to rule out celiac     Impression:  1. Likely from reflux and hiatal hernia     Recommend:  1. Here are some reflux precautions:   - Avoid trigger foods  - Anti-reflux measures: raising the head of the bed at night, avoiding tight clothing or belts, avoiding eating late at night and not lying down shortly after mealtime and achieving weight loss   - Avoid NSAID's, caffeine, peppermints, alcohol, tobacco and foods that incite symptoms   2. Continue pantoprazole daily before breakfast     >>>If tissue was sampled/removed and you have not received your pathology results either by phone or letter within 2 weeks, please call our office at [phone]. Specimens:  Duodenal biopsies, gastric polyps and distal esophageal biopsies  Final Diagnosis:      A. Duodenal biopsy:  Fragments of duodenal mucosa with preserved villous architecture. No evidence of dysplasia or carcinoma identified. B. Gastric polyp:   Polypoid fragments of gastric oxyntic type mucosa with focal features of fundic gland polyp and foveolar hyperplasia. No evidence of dysplasia or carcinoma identified. C. Distal esophagus; biopsy:  Fragments of squamocolumnar mucosa with mild chronic inflammation, features of reflux and reactive epithelial change.   No definitive evidence of intestinal metaplasia, dysplasia or carcinoma identified.       ===========================================================================    2023  presents for f/u. Questions about the hiatal hernia  Has bloating and has pain after eating anything  Mostly epigastric fullness  Miralax 2 capfuls daily and goes  Did help with the bloating and having BM now  Has tried miralax and other over the counters  Never tried linzess  Overall, the patient feels well and denies any GI symptoms of abdominal pain, nausea, vomiting, change in bowel habits, dyspepsia, dysphagia, hematemesis, hematochezia, or melena. Patient has a bowel movement each day with miralax. Additionally there is no change of appetite, unexpected weight loss, and no reported history of chest pain or shortness of breath.  ==========================================================    25 presents for follow up  Did xray and stool still seen  Started linzess  Was good and then stopped going for a few days  Started miralax and had caffeine and then had diarrhea  Then started double dose    Reflux is a lot better  Not having to take pepcid    Pertinent Surgical Hx:  - See additional surgical hx below  - No known issues with sedation.  - No known history of sleep apnea. Social Hx:  - No smoking/etoh  - Denies illicit drug use   - NSAIDs/ASA use: PRN      History, Medications, Allergies, ROS:      Past Medical History:   Diagnosis Date    Alopecia     Connective tissue disorder ([address]     Sleep apnea       Past Surgical History:   Procedure Laterality Date    COLONOSCOPY N/A 2023    Procedure: COLONOSCOPY;  Surgeon:  Judy Prieto MD;  Location: [facility] ENDOSCOPY    D & C      SAB    LEG/ANKLE SURGERY PROC UNLISTED            OTHER SURGICAL HISTORY  2009    breast augmentation and tummy tuck      Family History   Problem Relation Age of Onset    Diabetes Mother     Diabetes Father     High Blood Pressure Mother     High Blood Pressure Father     High Cholesterol Mother     High Cholesterol Father       Social History:   Social History     Socioeconomic History    Marital status: Legally    Tobacco Use    Smoking status: Never    Smokeless tobacco: Never   Vaping Use    Vaping Use: Never used   Substance and Sexual Activity    Alcohol use: No     Alcohol/week: 0.0 standard drinks of alcohol    Drug use: No    Sexual activity: Not Currently     Partners: Male   Other Topics Concern    Caffeine Concern Yes     Comment: 1-1.5 cups of coffee daily, 1 cup of tea daily    Exercise Yes     Comment: dancing 2-3 times weekly        Medications (Active prior to today's visit):  Current Outpatient Medications   Medication Sig Dispense Refill    linaCLOtide (LINZESS) 290 MCG Oral Cap Take 1 capsule (290 mcg total) by mouth every morning before breakfast. 120 capsule 2    NON FORMULARY Thyroid NP      spironolactone (ALDACTONE) 100 MG Oral Tab 1 tablet (100 mg total) daily.  0    HYDROXYCHLOROQUINE SULFATE 200 MG OR TABS 1 TABLET DAILY      DULoxetine 30 MG Oral Cap DR Particles Take 1 capsule (30 mg total) by mouth daily. valACYclovir 1 G Oral Tab Take 1 tablet (1,000 mg total) by mouth 2 (two) times daily. (Patient not taking: Reported on 5/25/2023)      Polyethylene Glycol 3350 (PEG 3350) 17 GM/SCOOP Oral Powder Take 1 Capful by mouth 2 (two) times daily. (Patient not taking: Reported on 5/25/2023) 1020 g 3    acyclovir 800 MG Oral Tab Take 1 tablet (800 mg total) by mouth 2 (two) times daily. (Patient not taking: Reported on 11/8/2023)         Allergies:   Allergies   Allergen Reactions    Hydrocodone NAUSEA AND VOMITING    Hydrocodone-Acetaminophen NAUSEA ONLY       ROS:   CONSTITUTIONAL:  negative for fevers, chills  EYES:  negative for change in vision  RESPIRATORY:  negative for  shortness of breath  CARDIOVASCULAR:  negative for  chest pain  GASTROINTESTINAL:  see HPI  GENITOURINARY:  negative for dysuria and hematuria   SKIN:  negative for rash  ALLERGIC/IMMUNOLOGIC:  negative for hay fever allergies  ENDOCRINE:  negative for cold intolerance and heat intolerance  MUSCULOSKELETAL:  negative for  joint stiffness and joint swelling  BEHAVIOR/PSYCH:  negative for depressed mood    PHYSICAL EXAM:   Blood pressure 133/73, pulse 72. Gen: patient appears comfortable and in no acute distress  HEENT: conjunctiva pink, the sclera appears anicteric, oropharynx clear, mucus membranes appear moist  Skin: dry, warm, no jaundice  Ext: no cyanosis, clubbing or edema is evident. Neuro: Alert and oriented x4, and patient is having movements of all 4 extremities   Psych: Pt has a normal mood and affect, behavior is normal    Nursing note and vitals reviewed      Labs/Imaging:     Patient's labs and imaging were reviewed and discussed with patient today. See HPI and A&P for further details. .  ASSESSMENT/PLAN:       1. History of constipation  2. Hiatal hernia and reflux    Recommend:  Now on the linzess 290 mcg daily  Add fiber to diet  Here are some reflux precautions:   - Avoid trigger foods  - Anti-reflux measures: raising the head of the bed at night, avoiding tight clothing or belts, avoiding eating late at night and not lying down shortly after mealtime and achieving weight loss   - Avoid NSAID's, caffeine, peppermints, alcohol, tobacco and foods that incite symptoms   - Tums as needed        Orders This Visit:  No orders of the defined types were placed in this encounter.       Meds This Visit:  Requested Prescriptions      No prescriptions requested or ordered in this encounter       Imaging & Referrals:  None

## 2024-01-24 ENCOUNTER — TELEPHONE (OUTPATIENT)
Dept: GASTROENTEROLOGY | Facility: CLINIC | Age: 59
End: 2024-01-24

## 2024-01-24 NOTE — TELEPHONE ENCOUNTER
Patient outreach message received:    Recall US in 6 months per Dr. Mena.  Message sent to pt outreach for repeat US (due February 2024).

## 2024-01-24 NOTE — TELEPHONE ENCOUNTER
RN called and spoke to pt, informed her she is due for US in 2/2024. Pt states she prefers to have imaging done at facility close to her home. Verified home address, US order mailed to pt's home. Pt verbalized understanding.

## 2024-01-26 ENCOUNTER — TELEPHONE (OUTPATIENT)
Facility: CLINIC | Age: 59
End: 2024-01-26

## 2024-01-26 NOTE — TELEPHONE ENCOUNTER
Pt wants to start a new wellness diet.  She states that in the beginning of this diet is increased amount of Raw vegetables.  She wants to know if this will be Ok to do or if will cause additional bowel issues.  Pt states that she is still having bowel issue while on Linzess.  Please call

## 2024-01-26 NOTE — TELEPHONE ENCOUNTER
Dr. Mena,   I called pt regarding her message below. She is taking linzess 290 mcg daily and will be starting a new diet; will only have raw veggies for one month. Pt states she historically gets constipated with veggies. Pt was worried about having only veggie diet while on linzess and hernia.    I informed pt that I would send to you but that I did not think it would be an issue to try new and temporary diet and see how she tolerates it. Instructed pt to call with any concerns. Pt agreeable and verbalized understanding.

## 2024-02-26 RX ORDER — LINACLOTIDE 145 UG/1
1 CAPSULE, GELATIN COATED ORAL DAILY
Qty: 30 CAPSULE | Refills: 5 | Status: SHIPPED | OUTPATIENT
Start: 2024-02-26 | End: 2024-03-27

## 2024-02-26 NOTE — TELEPHONE ENCOUNTER
Dr. Mena,   Pt most recently taking linzess 290 mcg daily but would like to go back down to 145 mcg daily. Pt currently on vegan diet and is having 2 stools a day. Pt states she still has some constipation but states stools are like sludge and denies straining. Are you agreeable to going back to 145 mcg? If so, please see pended order and sign.  Thank you,  Juanita

## 2024-02-27 NOTE — TELEPHONE ENCOUNTER
RN called and spoke to pt, informed her that MD ordered lower linzess dosing as requested. No further needs at this time.

## 2024-04-08 ENCOUNTER — TELEPHONE (OUTPATIENT)
Facility: CLINIC | Age: 59
End: 2024-04-08

## 2024-04-08 NOTE — TELEPHONE ENCOUNTER
1st overdue reminder letter sent to patient via mail.     LIVER (CPT=[id number]) (Order #[id number]) on 8/16/23

## 2024-05-01 ENCOUNTER — TELEPHONE (OUTPATIENT)
Facility: CLINIC | Age: 59
End: 2024-05-01

## 2024-05-02 NOTE — TELEPHONE ENCOUNTER
Called patient and reviewed  Eating veggies   Bloating more   Patient taking linzess daily   Off pantoprazole and only on pepcid  Rifaximin started for SIBO

## 2024-05-07 ENCOUNTER — TELEPHONE (OUTPATIENT)
Facility: CLINIC | Age: 59
End: 2024-05-07

## 2024-05-07 NOTE — TELEPHONE ENCOUNTER
Kike calling from [facility] prescription regards prior authorization for medication, please call.

## 2024-05-07 NOTE — TELEPHONE ENCOUNTER
Current Outpatient Medications   Medication Sig Dispense Refill    rifAXIMin 550 MG Oral Tab Take 1 tablet (550 mg total) by mouth 3 (three) times daily. 42 tablet 0    KEY: [id number]

## 2024-05-08 NOTE — TELEPHONE ENCOUNTER
RN called [facility] at #[phone] as RN unable to complete PA via covermymeds for rifaximin. RN spoke to Narcisa and completed PA verbally. Narcisa states that determination can take 4 days and determination should be faxed to GI office.     Reference #  [id number]

## 2024-05-13 RX ORDER — METRONIDAZOLE 250 MG/1
250 TABLET ORAL 4 TIMES DAILY
Qty: 56 TABLET | Refills: 0 | Status: SHIPPED | OUTPATIENT
Start: 2024-05-13 | End: 2024-05-27

## 2024-05-13 NOTE — TELEPHONE ENCOUNTER
You have tried one of the following or cannot use all (resistance, contraindication, or intolerance) of the following:       (A) Neomycin.       (B) Amoxicillin-clavulanic acid.       (C) Trimethoprim-sulfamethoxazole.       (D) Doxycycline or minocycline or tetracycline.       (E) Metronidazole.       (F) Cephalexin     Please see if patient took rifaximin or can start course of metronidazole for SIBO

## 2024-05-13 NOTE — TELEPHONE ENCOUNTER
Ordered  Please let her know that nausea is a common side effect  Do not drink alcohol while on the medication    Thank you.

## 2024-05-13 NOTE — TELEPHONE ENCOUNTER
Dr. Mena,   I spoke to pt, verified she did not start xifaxin. Informed pt that an alternate med would be ordered (metronidazole). Verified med should be ordered at [facility] in [place]. Please order when able.  Thanks,  Juanita

## 2024-05-13 NOTE — TELEPHONE ENCOUNTER
RN called and spoke to pt, informed her that medication was ordered, nausea is common side effect (but not guaranteed), and to avoid alcohol while on medication. Pt stated that she has two events coming up and would like to have alcohol; instructed pt to start treatment until after the two events have occurred as this treatment is not urgent. Pt agreeable and verbalized understanding.

## 2025-01-06 ENCOUNTER — TELEPHONE (OUTPATIENT)
Facility: CLINIC | Age: 60
End: 2025-01-06

## 2025-01-06 NOTE — TELEPHONE ENCOUNTER
Patient is having issues with medicine linzess it is not working properly and wondering if there is any alternative please call

## 2025-01-06 NOTE — TELEPHONE ENCOUNTER
20 minutes spent discussing with patient:    Called patient 4 pm and no answer  Eating well and was doing well -- Linzess was working  When eats differently then she has issues and diarrhea/constipation alternating-- 4 months off diet and now worse  Now concerned about going on cruise when eating differently  Salt also causes more swelling and even without salt swelling-- discussed seeing cardiology if concerned about fluid overload as never had Echo, etc    8 years had veins stripped and everything better for a short time  4-5 months ago went to vascular and said it was edema not the vessels    Cleanse discussed  Then linzess daily and miralax 1-2 capful as needed  Ok to follow up if does not work or after cruise if feeling better  Will see cardiology in the meantime    GI staff please book patient for followup  Ok to double book if needed

## 2025-01-06 NOTE — TELEPHONE ENCOUNTER
Patient is wondering if she can get a referral to see a Specialist for lymphedema please follow u

## 2025-01-06 NOTE — TELEPHONE ENCOUNTER
Dr. Mena,   Pt contacted office for two issues.     1) Currently taking linzess 290mcg daily and still having alternating stools. Can have small balls of stools for several days or watery diarrhea. Reports eating poorly since [place] so actually more constipated now.       Pt going on a cruise on 1/26/25 and very worried about diet and bowel issues. Pt not taking miralax or fiber supplements currently. Does vegetable juicing for fiber. Pt asking if she can see you before she goes on vacation.      2) Pt asking for a referral r/t generalized lymphedema. Pt states she wears stockings during the daytime.  Has intermittent swelling in trunk and arms also. Generalized swelling has been a chronic issue. Informed her that PCP may able to recommend who to see but would ask you.     Of note, pt states she had a liver US with PCP and no significant change to liver lesion.    Please advise. Pt may benefit from speaking with you.  Thanks,  Juanita

## 2025-03-05 ENCOUNTER — OFFICE VISIT (OUTPATIENT)
Dept: GASTROENTEROLOGY | Facility: CLINIC | Age: 60
End: 2025-03-05

## 2025-03-05 VITALS
HEIGHT: 63 IN | WEIGHT: 122.63 LBS | HEART RATE: 65 BPM | SYSTOLIC BLOOD PRESSURE: 111 MMHG | DIASTOLIC BLOOD PRESSURE: 69 MMHG | BODY MASS INDEX: 21.73 KG/M2

## 2025-03-05 DIAGNOSIS — K76.9 LIVER LESION: ICD-10-CM

## 2025-03-05 DIAGNOSIS — K21.9 GASTROESOPHAGEAL REFLUX DISEASE, UNSPECIFIED WHETHER ESOPHAGITIS PRESENT: Primary | ICD-10-CM

## 2025-03-05 DIAGNOSIS — K59.00 CONSTIPATION, UNSPECIFIED CONSTIPATION TYPE: ICD-10-CM

## 2025-03-05 PROCEDURE — 3078F DIAST BP <80 MM HG: CPT | Performed by: INTERNAL MEDICINE

## 2025-03-05 PROCEDURE — 3074F SYST BP LT 130 MM HG: CPT | Performed by: INTERNAL MEDICINE

## 2025-03-05 PROCEDURE — 99214 OFFICE O/P EST MOD 30 MIN: CPT | Performed by: INTERNAL MEDICINE

## 2025-03-05 PROCEDURE — 3008F BODY MASS INDEX DOCD: CPT | Performed by: INTERNAL MEDICINE

## 2025-03-05 RX ORDER — LINACLOTIDE 290 UG/1
290 CAPSULE, GELATIN COATED ORAL
COMMUNITY

## 2025-03-05 RX ORDER — FUROSEMIDE 20 MG/1
20 TABLET ORAL EVERY OTHER DAY
COMMUNITY
Start: 2025-02-26

## 2025-03-05 NOTE — PATIENT INSTRUCTIONS
1. History of constipation  2. Hiatal hernia and reflux  3. MR liver lesion follow up    Recommend:  Now on the linzess 290 mcg daily  Add fiber to diet  Here are some reflux precautions:   - Avoid trigger foods  - Anti-reflux measures: raising the head of the bed at night, avoiding tight clothing or belts, avoiding eating late at night and not lying down shortly after mealtime and achieving weight loss   - Avoid NSAID's, caffeine, peppermints, alcohol, tobacco and foods that incite symptoms   - Tums as needed  - MR liver lesion ordered  - EGD/colonoscopy 2023  - recall due 1/2033 unless new symptom

## 2025-03-05 NOTE — PROGRESS NOTES
[facility] - Gastroenterology  Clinic Follow-up Visit    Chief Complaint   Patient presents with    Follow - Up         Subjective/HPI:   Paula Hough is a 59 year old female, patient of Dr. Orozco with history of alopecia, connective tissue disease, who presents for hiatal hernia.       Past Visit(s):  12/15/2022-- seen by Triny Orlando NP  Kathleen Hough is a 57 year old year-old female with history of alopecia, connective tissue d/o:     she is here today for evaluation rectal complaints  She reports constipation x last year and since going off of birth control     Was using a supplement from [employer] for arthritis pain.  Was taken off the market bc had diclofenac sodium and methocarbamol in some of the bottles.  Has been taking it for 2 years. Has noticed weight gain since stopping medications.  Has had increased joint pain.  Is using calorie restriction to lose weight.      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 Was treated by infectious disease.  Endorses anal intercourse.   She says had lab testing (-) HIV, Hep testing, syphillis  Did not have anal pap/gyne exam  Has had vaginal spotting and planning to see gyne     Endorses oral sex with partner  Denies ulcer is mouth, pharyngitis. She denies acid reflux and/or heartburn. she denies dysphagia, odynophagia and/or globus. she denies abdominal pain. she denies nausea and/or vomiting.  she denies recent change in appetite and/or unintentional weight loss.     Baseline bm daily. Now moving bowels every few days.   Has tried stool softeners, high fiber diet minimal improvement. kamara magnesium, dulcolax works when she takes it.  Has incomplete evacuation. she says has low volume bleeding after bm on toilet paper. No rectal pain. No melena.      No vision issues  ======================================================  Colonoscopy with Dr. Bolivar 1/2023    Findings:   -- TERRY:  normal rectal tone, no masses palpated. Visual exam of anus shows faint skin splits at the anus, significance not known     -- Terminal ileum: the visualized mucosa appeared normal.     -- 1 polyp(s) noted as follows:      A. 4 mm polyp in the hepatic flexure; sessile morphology; cold snare polypectomy and retrieved.     -- A retroflexed view of the rectum revealed no abnormalities.     -- The colonic mucosa throughout the colon showed normal vascular pattern, without evidence of angioectasias or inflammation.      Impression:   One small polyp resected and retrieved  Otherwise normal colonoscopy     Recommend:  Pending pathology, repeat colonoscopy in 5-7 years.  Treatment of HSV1 per primary    F/u with Triny Orlando NP 5/25/2023-------------------------------------  Paula Hough is a 58 year old year-old female with history of alopecia, connective tissue d/o:     she is here today for f/u  S/p cln w/ Dr. Bolivar 1/2023  Diminutive polyp normal mucosa.  Repeat colonoscopy advised in 10 years  Started on miralax for constipation     Contacted office 5/2023 w/ c/o acid reflux. Onset around easter and after eating more.    She describes burning pain in abd, chest.  Says was severe and thought about calling 911.  Started zantac OTC     She says has realized she has been taking having reflux x last year. Drinks coffee in am and seems worse afterwards.  Started drinking kefir with some improvement.  She hasn't been taking medication. she denies dysphagia, odynophagia and/or globus. she denies abdominal pain. she denies nausea and/or vomiting.  she denies recent change in appetite and/or unintentional weight loss.  She denies chest pain and/or exertional sx.      she moves her bowels daily with miralax. she denies straining and/or incomplete evacuation.  she denies brbpr and/or melena.     EGD 7/2023==============================================    Findings:    1. Esophagus: The squamocolumnar junction was  noted at 35 cm and appeared irregular. The GE junction was noted at 35 cm from the incisors. Hiatus was at 40 cm. The esophageal mucosa appeared otherwise normal. Distal esophageal biopsies taken of the 1 cm tongue to rule out barretts     2. Stomach: The stomach distended normally. Normal rugal folds were seen. The pylorus was patent. The gastric mucosa appeared normal but there were small polyps, two removed with cold biopsy polypectomy. Retroflexion revealed a normal fundus and a normal-patulous cardia.      3. Duodenum: The duodenal mucosa appeared normal in the 1st and 2nd portion of the duodenum. Biopsied to rule out celiac     Impression:  1. Likely from reflux and hiatal hernia     Recommend:  1. Here are some reflux precautions:   - Avoid trigger foods  - Anti-reflux measures: raising the head of the bed at night, avoiding tight clothing or belts, avoiding eating late at night and not lying down shortly after mealtime and achieving weight loss   - Avoid NSAID's, caffeine, peppermints, alcohol, tobacco and foods that incite symptoms   2. Continue pantoprazole daily before breakfast     >>>If tissue was sampled/removed and you have not received your pathology results either by phone or letter within 2 weeks, please call our office at [phone].     Specimens:  Duodenal biopsies, gastric polyps and distal esophageal biopsies  Final Diagnosis:      A. Duodenal biopsy:  Fragments of duodenal mucosa with preserved villous architecture.  No evidence of dysplasia or carcinoma identified.     B. Gastric polyp:   Polypoid fragments of gastric oxyntic type mucosa with focal features of fundic gland polyp and foveolar hyperplasia.  No evidence of dysplasia or carcinoma identified.       C. Distal esophagus; biopsy:  Fragments of squamocolumnar mucosa with mild chronic inflammation, features of reflux and reactive epithelial change.  No definitive evidence of intestinal metaplasia, dysplasia or carcinoma identified.        ===========================================================================    8/9/2023  presents for f/u.  Questions about the hiatal hernia  Has bloating and has pain after eating anything  Mostly epigastric fullness  Miralax 2 capfuls daily and goes  Did help with the bloating and having BM now  Has tried miralax and other over the counters  Never tried linzess  Overall, the patient feels well and denies any GI symptoms of abdominal pain, nausea, vomiting, change in bowel habits, dyspepsia, dysphagia, hematemesis, hematochezia, or melena. Patient has a bowel movement each day with miralax. Additionally there is no change of appetite, unexpected weight loss, and no reported history of chest pain or shortness of breath.  ==========================================================    11/08/23 presents for follow up  Did xray and stool still seen  Started linzess  Was good and then stopped going for a few days  Started miralax and had caffeine and then had diarrhea  Then started double dose    Reflux is a lot better  Not having to take pepcid    03/05/25 presents for follow up=======================  1/6/2025 -- 20 minutes spent discussing with patient:     \"Called patient 4 pm and no answer  Eating well and was doing well -- Linzess was working  When eats differently then she has issues and diarrhea/constipation alternating-- 4 months off diet and now worse  Now concerned about going on cruise when eating differently  Salt also causes more swelling and even without salt swelling-- discussed seeing cardiology if concerned about fluid overload as never had Echo, etc     8 years had veins stripped and everything better for a short time  4-5 months ago went to vascular and said it was edema not the vessels     Cleanse discussed  Then linzess daily and miralax 1-2 capful as needed  Ok to follow up if does not work or after cruise if feeling better  Will see cardiology in the meantime     GI staff please book patient  for followup  Ok to double book if needed\"    Now coming in for follow up  Saw 3 vascular specialists for swelling  Saw Dr. Schultz (3rd)  5/10 to see if saphenous vein surgery would help  Did furosemide and that has helped some of the swelling  Now doing one pill every other day  Weight per patient shifts 2-3 lbs  Not eating well  Still diarrhea and constipation alternating-- one formed stool a week  On 290 mcg linzess  Taking multivitamin  Saw thyroid doctor as well and watching nodules  Repeat Liver US done 2024 for follow up  Liver nodules slightly larger  MR to better evaluate the liver lesions      Wt Readings from Last 10 Encounters:   25 122 lb 9.6 oz (55.6 kg)   23 126 lb (57.2 kg)   23 125 lb (56.7 kg)   12/15/22 125 lb (56.7 kg)   12/02/15 127 lb 9.6 oz (57.9 kg)   11 136 lb 3.2 oz (61.8 kg)     Colonoscopy 2023-- recall due 2033 unless new symptom  EGD 2023      Pertinent Surgical Hx:  - See additional surgical hx below  - No known issues with sedation.  - No known history of sleep apnea.    Social Hx:  - No smoking/etoh  - Denies illicit drug use   - NSAIDs/ASA use: PRN      History, Medications, Allergies, ROS:      Past Medical History:    Alopecia    Connective tissue disorder (HCC)    Sleep apnea      Past Surgical History:   Procedure Laterality Date    Colonoscopy N/A 2023    Procedure: COLONOSCOPY;  Surgeon: Tammie Bolivar MD;  Location: [facility] ENDOSCOPY    D & c      [facility]    Leg/ankle surgery proc unlisted            Other surgical history  2009    breast augmentation and tummy tuck      Family History   Problem Relation Age of Onset    Diabetes Mother     Diabetes Father     High Blood Pressure Mother     High Blood Pressure Father     High Cholesterol Mother     High Cholesterol Father       Social History:   Social History     Socioeconomic History    Marital status: Legally    Tobacco Use    Smoking status: Never    Smokeless tobacco: Never    Vaping Use    Vaping status: Never Used   Substance and Sexual Activity    Alcohol use: No     Alcohol/week: 0.0 standard drinks of alcohol    Drug use: No    Sexual activity: Not Currently     Partners: Male   Other Topics Concern    Caffeine Concern Yes     Comment: 1-1.5 cups of coffee daily, 1 cup of tea daily    Exercise Yes     Comment: dancing 2-3 times weekly        Medications (Active prior to today's visit):  Current Outpatient Medications   Medication Sig Dispense Refill    furosemide 20 MG Oral Tab Take 1 tablet (20 mg total) by mouth every other day.      LINZESS 290 MCG Oral Cap Take 1 capsule (290 mcg total) by mouth before breakfast.      DULoxetine 30 MG Oral Cap DR Particles Take 1 capsule (30 mg total) by mouth daily. (Patient not taking: Reported on 3/5/2025)      valACYclovir 1 G Oral Tab Take 1 tablet (1,000 mg total) by mouth 2 (two) times daily. (Patient not taking: Reported on 3/5/2025)      Polyethylene Glycol 3350 (PEG 3350) 17 GM/SCOOP Oral Powder Take 1 Capful by mouth 2 (two) times daily. (Patient not taking: Reported on 3/5/2025) 1020 g 3    acyclovir 800 MG Oral Tab Take 1 tablet (800 mg total) by mouth 2 (two) times daily. (Patient not taking: Reported on 3/5/2025)      NON FORMULARY Thyroid NP (Patient not taking: Reported on 3/5/2025)      spironolactone (ALDACTONE) 100 MG Oral Tab 1 tablet (100 mg total) daily. (Patient not taking: Reported on 3/5/2025)  0    HYDROXYCHLOROQUINE SULFATE 200 MG OR TABS 1 TABLET DAILY (Patient not taking: Reported on 3/5/2025)         Allergies:  Allergies   Allergen Reactions    Hydrocodone NAUSEA AND VOMITING    Hydrocodone-Acetaminophen NAUSEA ONLY       ROS:   CONSTITUTIONAL:  negative for fevers, chills  EYES:  negative for change in vision  RESPIRATORY:  negative for  shortness of breath  CARDIOVASCULAR:  negative for  chest pain  GASTROINTESTINAL:  see HPI  GENITOURINARY:  negative for dysuria and hematuria   SKIN:  negative for   rash  ALLERGIC/IMMUNOLOGIC:  negative for hay fever allergies  ENDOCRINE:  negative for cold intolerance and heat intolerance  MUSCULOSKELETAL:  negative for  joint stiffness and joint swelling  BEHAVIOR/PSYCH:  negative for depressed mood    PHYSICAL EXAM:   Height 5' 3\" (1.6 m), weight 122 lb 9.6 oz (55.6 kg).    Gen: patient appears comfortable and in no acute distress  HEENT: conjunctiva pink, the sclera appears anicteric, oropharynx clear, mucus membranes appear moist  Skin: dry, warm, no jaundice  Ext: no cyanosis, clubbing or edema is evident.   Neuro: Alert and oriented x4, and patient is having movements of all 4 extremities   Psych: Pt has a normal mood and affect, behavior is normal    Nursing note and vitals reviewed      Labs/Imaging:     Patient's labs and imaging were reviewed and discussed with patient today.  See HPI and A&P for further details.     .  ASSESSMENT/PLAN:       1. History of constipation  2. Hiatal hernia and reflux  3. MR liver lesion follow up    Recommend:  Now on the linzess 290 mcg daily  Add fiber to diet  Here are some reflux precautions:   - Avoid trigger foods  - Anti-reflux measures: raising the head of the bed at night, avoiding tight clothing or belts, avoiding eating late at night and not lying down shortly after mealtime and achieving weight loss   - Avoid NSAID's, caffeine, peppermints, alcohol, tobacco and foods that incite symptoms   - Tums as needed  - MR liver lesion ordered  - EGD/colonoscopy 2023  - recall due 1/2033 unless new symptom        Orders This Visit:  No orders of the defined types were placed in this encounter.      Meds This Visit:  Requested Prescriptions      No prescriptions requested or ordered in this encounter       Imaging & Referrals:  None

## 2025-04-09 ENCOUNTER — HOSPITAL ENCOUNTER (OUTPATIENT)
Dept: MRI IMAGING | Age: 60
Discharge: HOME | End: 2025-04-09
Attending: INTERNAL MEDICINE
Payer: COMMERCIAL

## 2025-04-09 DIAGNOSIS — K76.9 LIVER LESION: ICD-10-CM

## 2025-04-09 PROCEDURE — A9575 INJ GADOTERATE MEGLUMI 0.1ML: HCPCS | Performed by: INTERNAL MEDICINE

## 2025-04-09 PROCEDURE — 74183 MRI ABD W/O CNTR FLWD CNTR: CPT | Performed by: INTERNAL MEDICINE

## 2025-04-09 RX ORDER — GADOTERATE MEGLUMINE 376.9 MG/ML
15 INJECTION INTRAVENOUS
Status: COMPLETED | OUTPATIENT
Start: 2025-04-09 | End: 2025-04-09

## 2025-04-09 RX ADMIN — GADOTERATE MEGLUMINE 12 ML: 376.9 INJECTION INTRAVENOUS at 11:37:00

## 2025-04-11 ENCOUNTER — TELEPHONE (OUTPATIENT)
Facility: CLINIC | Age: 60
End: 2025-04-11

## 2025-04-11 NOTE — TELEPHONE ENCOUNTER
Dr. Mena,   Pt calling regarding recent MRI results. She is asking if any further testing is needed.     Also asking if the MRI showed any abnormalities regarding her kidneys or hiatal hernia. I reviewed kidney portion seems unremarkable but don't see any notation of hiatal hernia.     Pt states she has a \"large hiatal hernia, rated 5\".       Her BLE lymphedema is not getting better. Vascular surgeon doesn't think surgery will give her the results she is looking for. She doesn't feel taking lasix every other day is optimal. She is asking if you have any recommendations on how to proceed. Should she need to see a nephrologist?     May be best if you can call pt but if unable, please advise.  Thank you,  Juanita

## 2025-04-17 NOTE — TELEPHONE ENCOUNTER
Liver lesions benign  No abdominal hernia, did not see hiatal hernia on imaging or comment on it. No thing abnormal on kidneys commented on per radiology  As for LE edema would recommend primary to refer to correct speciality as has tried vascular and others prior

## 2025-04-18 NOTE — TELEPHONE ENCOUNTER
Dr. Mena,   I spoke to pt regarding your below message including deferring to PCP on who to see for BLE edema. Pt states her PCP does not know who to refer her to. Pt is asking \"who would you see if you had to go to a kidney doctor?\".  Thanks,  Juanita

## 2025-04-30 NOTE — TELEPHONE ENCOUNTER
I apologize but this is not something I know either. For kidney specialist the entire [facility] is great. A few are:    Lucho Garcia M.D.  Tiffany Jansen M.D.  Monster Trejo M.D.    Thank you.

## (undated) DEVICE — SNARE 9MM 230CM 2.4MM EXACTO

## (undated) DEVICE — KIT CLEAN ENDOKIT 1.1OZ GOWNX2

## (undated) DEVICE — KIT ENDO ORCAPOD 160/180/190

## (undated) DEVICE — Device: Brand: DUAL NARE NASAL CANNULAE FEMALE LUER CON 7FT O2 TUBE

## (undated) DEVICE — TRAP 4 CPTR CHMBR N EZ INLN

## (undated) DEVICE — MEDI-VAC NON-CONDUCTIVE SUCTION TUBING 6MM X 1.8M (6FT.) L: Brand: CARDINAL HEALTH

## (undated) DEVICE — 60 ML SYRINGE REGULAR TIP: Brand: MONOJECT

## (undated) NOTE — Clinical Note
Saran Garcia, Just Fyi she had anal swab (+) HSV-1 in Feb and was treated by ID. Endorses having both anal and oral intercourse with partner. Hep, hiv, syphillis testing (-). Overdue for pap and I advised gyne f/u. No upper gi complaints, pharyngitis, etc.  Was having some low volume bleeding seemingly from perianal herpetic lesions. Planning for cln and scheduled with you 1/26. Thanks!  Emiliano Blankenship

## (undated) NOTE — LETTER
[address] [place] [address], [place]  Authorization for Invasive Procedure                                                                                           1. I hereby authorize Cecy Murry MD, my physician and his/her assistants (if applicable), which may include medical students, residents, and/or fellows, to perform the following surgical operation/ procedure and administer such anesthesia as may be determined necessary by my physician: Operation/Procedure name (s) COLONOSCOPY on [place]   2. I recognize that during the surgical operation/procedure, unforeseen conditions may necessitate additional or different procedures than those listed above. I, therefore, further authorize and request that the above-named surgeon, assistants, or designees perform such procedures as are, in their judgment, necessary and desirable. 3.   My surgeon/physician has discussed prior to my surgery the potential benefits, risks and side effects of this procedure; the likelihood of achieving goals; and potential problems that might occur during recuperation. They also discussed reasonable alternatives to the procedure, including risks, benefits, and side effects related to the alternatives and risks related to not receiving this procedure. I have had all my questions answered and I acknowledge that no guarantee has been made as to the result that may be obtained. 4.   Should the need arise during my operation/procedure, which includes change of level of care prior to discharge, I also consent to the administration of blood and/or blood products. Further, I understand that despite careful testing and screening of blood or blood products by collecting agencies, I may still be subject to ill effects as a result of receiving a blood transfusion and/or blood products.   The following are some, but not all, of the potential risks that can occur: fever and allergic reactions, hemolytic reactions, transmission of diseases such as Hepatitis, AIDS and Cytomegalovirus (CMV) and fluid overload. In the event that I wish to have an autologous transfusion of my own blood, or a directed donor transfusion, I will discuss this with my physician. Check only if Refusing Blood or Blood Products  I understand refusal of blood or blood products as deemed necessary by my physician may have serious consequences to my condition to include possible death. I hereby assume responsibility for my refusal and release the hospital, its personnel, and my physicians from any responsibility for the consequences of my refusal.    o  Refuse   5. I authorize the use of any specimen, organs, tissues, body parts or foreign objects that may be removed from my body during the operation/procedure for diagnosis, research or teaching purposes and their subsequent disposal by hospital authorities. I also authorize the release of specimen test results and/or written reports to my treating physician on the hospital medical staff or other referring or consulting physicians involved in my care, at the discretion of the Pathologist or my treating physician. 6.   I consent to the photographing or videotaping of the operations or procedures to be performed, including appropriate portions of my body for medical, scientific, or educational purposes, provided my identity is not revealed by the pictures or by descriptive texts accompanying them. If the procedure has been photographed/videotaped, the surgeon will obtain the original picture, image, videotape or CD. The hospital will not be responsible for storage, release or maintenance of the picture, image, tape or CD.    7.   I consent to the presence of a  or observers in the operating room as deemed necessary by my physician or their designees.     8.   I recognize that in the event my procedure results in extended X-Ray/fluoroscopy time, I may develop a skin reaction. 9. If I have a Do Not Attempt Resuscitation (DNAR) order in place, that status will be suspended while in the operating room, procedural suite, and during the recovery period unless otherwise explicitly stated by me (or a person authorized to consent on my behalf). The surgeon or my attending physician will determine when the applicable recovery period ends for purposes of reinstating the DNAR order. 10. Patients having a sterilization procedure: I understand that if the procedure is successful the results will be permanent and it will therefore be impossible for me to inseminate, conceive, or bear children. I also understand that the procedure is intended to result in sterility, although the result has not been guaranteed. 11. I acknowledge that my physician has explained sedation/analgesia administration to me including the risk and benefits I consent to the administration of sedation/analgesia as may be necessary or desirable in the judgment of my physician. I CERTIFY THAT I HAVE READ AND FULLY UNDERSTAND THE ABOVE CONSENT TO OPERATION and/or OTHER PROCEDURE.     _________________________________________ _________________________________     ___________________________________  Signature of Patient     Signature of Responsible Person                   Printed Name of Responsible Person                              _________________________________________ ______________________________        ___________________________________  Signature of Witness         Date  Time         Relationship to Patient    STATEMENT OF PHYSICIAN My signature below affirms that prior to the time of the procedure; I have explained to the patient and/or his/her legal representative, the risks and benefits involved in the proposed treatment and any reasonable alternative to the proposed treatment.  I have also explained the risks and benefits involved in refusal of the proposed treatment and alternatives to the proposed treatment and have answered the patient's questions.  If I have a significant financial interest in a co-management agreement or a significant financial interest in any product or implant, or other significant relationship used in this procedure/surgery, I have disclosed this and had a discussion with my patient.     _______________________________________________________________ _____________________________  Kaylynn [place] of Physician)                                                                                         (Date)                                   (Time)  Patient Name: Nkechi Head    : 1965   Printed: 2023      Medical Record #: [id number]                                              Page 1 of 1

## (undated) NOTE — LETTER
11/7/2023              [address] [facility] [address]         Dear Halie Roger,    [facility] records indicate that the tests ordered for you by Ronnell Pereira. Zadie Blizzard  have not been done. If you have, in fact, already completed the tests or you do not wish to have the tests done, please contact our office at [address] [place] [address]. Otherwise, please proceed with the testing. Enclosed is a duplicate order for your convenience. To schedule a test at any [facility], call [facility] at [phone], Monday through Friday between 7:30am to 6pm and on Saturday between 8am and 1pm.   Imaging order:    [address] Saige Healy ([id number]=[id number]) (Order #[id number]) on 7/13/23       Sincerely,    Ronnell Pereira.  Zadie Blizzard  [facility] [address], [address] [address] [phone]

## (undated) NOTE — LETTER
[address]  Authorization for Surgical Operation and Procedure                                                                                           1. I hereby authorize Jennifer Hays MD, my physician and his/her assistants (if applicable), which may include medical students, residents, and/or fellows, to perform the following surgical operation/ procedure and administer such anesthesia as may be determined necessary by my physician: Operation/Procedure name (s) COLONOSCOPY on [address]   2. I recognize that during the surgical operation/procedure, unforeseen conditions may necessitate additional or different procedures than those listed above. I, therefore, further authorize and request that the above-named surgeon, assistants, or designees perform such procedures as are, in their judgment, necessary and desirable. 3.   My surgeon/physician has discussed prior to my surgery the potential benefits, risks and side effects of this procedure; the likelihood of achieving goals; and potential problems that might occur during recuperation. They also discussed reasonable alternatives to the procedure, including risks, benefits, and side effects related to the alternatives and risks related to not receiving this procedure. I have had all my questions answered and I acknowledge that no guarantee has been made as to the result that may be obtained. 4.   Should the need arise during my operation/procedure, which includes change of level of care prior to discharge, I also consent to the administration of blood and/or blood products. Further, I understand that despite careful testing and screening of blood or blood products by collecting agencies, I may still be subject to ill effects as a result of receiving a blood transfusion and/or blood products.   The following are some, but not all, of the potential risks that can occur: fever and allergic reactions, hemolytic reactions, transmission of diseases such as Hepatitis, AIDS and Cytomegalovirus (CMV) and fluid overload. In the event that I wish to have an autologous transfusion of my own blood, or a directed donor transfusion, I will discuss this with my physician. Check only if Refusing Blood or Blood Products  I understand refusal of blood or blood products as deemed necessary by my physician may have serious consequences to my condition to include possible death. I hereby assume responsibility for my refusal and release the hospital, its personnel, and my physicians from any responsibility for the consequences of my refusal.    o  Refuse   5. I authorize the use of any specimen, organs, tissues, body parts or foreign objects that may be removed from my body during the operation/procedure for diagnosis, research or teaching purposes and their subsequent disposal by hospital authorities. I also authorize the release of specimen test results and/or written reports to my treating physician on the hospital medical staff or other referring or consulting physicians involved in my care, at the discretion of the Pathologist or my treating physician. 6.   I consent to the photographing or videotaping of the operations or procedures to be performed, including appropriate portions of my body for medical, scientific, or educational purposes, provided my identity is not revealed by the pictures or by descriptive texts accompanying them. If the procedure has been photographed/videotaped, the surgeon will obtain the original picture, image, videotape or CD. The hospital will not be responsible for storage, release or maintenance of the picture, image, tape or CD.    7.   I consent to the presence of a  or observers in the operating room as deemed necessary by my physician or their designees.     8.   I recognize that in the event my procedure results in extended X-Ray/fluoroscopy time, I may develop a skin reaction. 9. If I have a Do Not Attempt Resuscitation (DNAR) order in place, that status will be suspended while in the operating room, procedural suite, and during the recovery period unless otherwise explicitly stated by me (or a person authorized to consent on my behalf). The surgeon or my attending physician will determine when the applicable recovery period ends for purposes of reinstating the DNAR order. 10. Patients having a sterilization procedure: I understand that if the procedure is successful the results will be permanent and it will therefore be impossible for me to inseminate, conceive, or bear children. I also understand that the procedure is intended to result in sterility, although the result has not been guaranteed. 11. I acknowledge that my physician has explained sedation/analgesia administration to me including the risk and benefits I consent to the administration of sedation/analgesia as may be necessary or desirable in the judgment of my physician. I CERTIFY THAT I HAVE READ AND FULLY UNDERSTAND THE ABOVE CONSENT TO OPERATION and/or OTHER PROCEDURE.     _________________________________________ _________________________________     ___________________________________  Signature of Patient     Signature of Responsible Person                   Printed Name of Responsible Person                              _________________________________________ ______________________________        ___________________________________  Signature of Witness         Date  Time         Relationship to Patient    STATEMENT OF PHYSICIAN My signature below affirms that prior to the time of the procedure; I have explained to the patient and/or his/her legal representative, the risks and benefits involved in the proposed treatment and any reasonable alternative to the proposed treatment.  I have also explained the risks and benefits involved in refusal of the proposed treatment and alternatives to the proposed treatment and have answered the patient's questions.  If I have a significant financial interest in a co-management agreement or a significant financial interest in any product or implant, or other significant relationship used in this procedure/surgery, I have disclosed this and had a discussion with my patient.     _______________________________________________________________ _____________________________  (Signature of Physician)                                                                                         (Date)                                   (Time)  Patient Name: Salena Longoria    : 1965   Printed: 2023      Medical Record #: [id number]                                              Page 1 of